# Patient Record
Sex: MALE | Race: OTHER | NOT HISPANIC OR LATINO | URBAN - METROPOLITAN AREA
[De-identification: names, ages, dates, MRNs, and addresses within clinical notes are randomized per-mention and may not be internally consistent; named-entity substitution may affect disease eponyms.]

---

## 2022-05-23 ENCOUNTER — EMERGENCY (EMERGENCY)
Facility: HOSPITAL | Age: 52
LOS: 1 days | Discharge: HOME | End: 2022-05-23
Attending: EMERGENCY MEDICINE
Payer: SELF-PAY

## 2022-05-23 VITALS
HEIGHT: 63.78 IN | RESPIRATION RATE: 20 BRPM | SYSTOLIC BLOOD PRESSURE: 130 MMHG | HEART RATE: 66 BPM | OXYGEN SATURATION: 99 % | TEMPERATURE: 98 F | WEIGHT: 160.06 LBS | DIASTOLIC BLOOD PRESSURE: 80 MMHG

## 2022-05-23 PROCEDURE — 70450 CT HEAD/BRAIN W/O DYE: CPT | Mod: 26,MA

## 2022-05-23 PROCEDURE — 72074 X-RAY EXAM THORAC SPINE4/>VW: CPT

## 2022-05-23 PROCEDURE — 72110 X-RAY EXAM L-2 SPINE 4/>VWS: CPT

## 2022-05-23 PROCEDURE — 72040 X-RAY EXAM NECK SPINE 2-3 VW: CPT

## 2022-05-23 PROCEDURE — 72110 X-RAY EXAM L-2 SPINE 4/>VWS: CPT | Mod: 26

## 2022-05-23 PROCEDURE — 72074 X-RAY EXAM THORAC SPINE4/>VW: CPT | Mod: 26

## 2022-05-23 PROCEDURE — 72040 X-RAY EXAM NECK SPINE 2-3 VW: CPT | Mod: 26

## 2022-05-23 PROCEDURE — 99284 EMERGENCY DEPT VISIT MOD MDM: CPT

## 2022-05-23 PROCEDURE — 96372 THER/PROPH/DIAG INJ SC/IM: CPT

## 2022-05-23 PROCEDURE — 99284 EMERGENCY DEPT VISIT MOD MDM: CPT | Mod: 25

## 2022-05-23 PROCEDURE — 70450 CT HEAD/BRAIN W/O DYE: CPT | Mod: MA

## 2022-05-23 RX ORDER — KETOROLAC TROMETHAMINE 30 MG/ML
30 SYRINGE (ML) INJECTION ONCE
Refills: 0 | Status: DISCONTINUED | OUTPATIENT
Start: 2022-05-23 | End: 2022-05-23

## 2022-05-23 RX ORDER — METHOCARBAMOL 500 MG/1
750 TABLET, FILM COATED ORAL ONCE
Refills: 0 | Status: COMPLETED | OUTPATIENT
Start: 2022-05-23 | End: 2022-05-23

## 2022-05-23 RX ADMIN — Medication 30 MILLIGRAM(S): at 13:03

## 2022-05-23 RX ADMIN — METHOCARBAMOL 750 MILLIGRAM(S): 500 TABLET, FILM COATED ORAL at 13:04

## 2022-05-23 NOTE — ED STATDOCS - OBJECTIVE STATEMENT
50 y/o male with no PMHx presents to ED c/o back pain/injury. Patient reports 3 days ago while at work, he was pulling some construction equipment and tripped. States he hit his head on the floor and twisted his ankle, endorses he lost consciousness for 2 minutes. Currently endorsing back pain, neck pain. Patient took Motrin and Tylenol with mild relief.      : Rosalina

## 2022-05-23 NOTE — ED STATDOCS - MUSCULOSKELETAL, MLM
swelling at the base of the left 5th metatarsal, neuro intact, full ROM at the ankle; (+) spinal tenderness, no step offs or deformities

## 2022-05-23 NOTE — ED STATDOCS - ATTENDING CONTRIBUTION TO CARE
I, Yesica Cintron, performed the initial face to face bedside interview with this patient regarding history of present illness, review of symptoms and relevant past medical, social and family history.  I completed an independent physical examination.  I was the initial provider who evaluated this patient. I have signed out the follow up of any pending tests (i.e. labs, radiological studies) to the resident.  I have communicated the patient’s plan of care and disposition with the resident.  The history, relevant review of systems, past medical and surgical history, medical decision making, and physical examination was documented by the scribe in my presence and I attest to the accuracy of the documentation.

## 2022-05-23 NOTE — ED STATDOCS - CLINICAL SUMMARY MEDICAL DECISION MAKING FREE TEXT BOX
Patient with multiple msk complaints after a fall at work 3 days go. Will check CT head, spine XR, left foot XR, treat pain, and re-assess. Patient with multiple msk complaints after a fall at work 3 days go. Will check CT head, spine XR, left foot XR, treat pain, and re-assess.    Pain improved. Will dc to home with instructions for conservative mgmt. Return precautions and discharge instructions given

## 2022-05-23 NOTE — ED STATDOCS - NSFOLLOWUPINSTRUCTIONS_ED_ALL_ED_FT
[place] Ibuprofen 600mg 2-3 veces al jerrell por 2-3 summers      Dolor musculoesquelético    Musculoskeletal Pain      "Dolor musculoesquelético" hace referencia a los tashi y las molestias en los huesos, las articulaciones, los músculos y los tejidos que los rodean. Sindhu dolor puede ocurrir en cualquier parte del cuerpo. Puede durar un breve período (catia) o prolongarse mucho tiempo (crónico).    Es posible que se realicen un examen físico, análisis de laboratorio y estudios de diagnóstico por imágenes para encontrar la causa del dolor musculoesquelético.      Siga estas instrucciones en sheets casa:    Estilo de toñito   •Trate de controlar o reducir los niveles de estrés. El estrés aumenta la tensión muscular y puede empeorar el dolor musculoesquelético. Es importante reconocer cuando está ansioso o estresado y aprender distintas formas de controlar sindhu estado. [place] puede incluir:  •Yoga o meditación.      •Terapia cognitiva o conductual.      •Acupuntura o terapia de masajes.        •Podrá seguir con todas las actividades, a menos que estas le generen más dolor. Cuando el dolor disminuya, retome las actividades habituales de a poco. Aumente gradualmente la intensidad y la duración de las actividades o del ejercicio que realice.        Control del dolor, la rigidez y la hinchazón                   •El tratamiento puede incluir medicamentos para el dolor y la inflamación que se marimar por boca o que se aplican sobre la piel. Use los medicamentos de venta peña y los recetados solamente vanessa se lo haya indicado el médico.      •Si el dolor es intenso, el reposo en cama puede ser beneficioso. Acuéstese o siéntese en cualquier posición que sea cómoda, dacia salga de la cama y camine al menos cada dos horas.    •Si se lo indican, aplique calor en la jayleen afectada con la frecuencia que le haya indicado el médico. Use la clarke de calor que el médico le recomiende, vanessa anriudh compresa de calor húmedo o anirudh almohadilla térmica.  •Coloque anirudh toalla entre la piel y la clarke de calor.      •Aplique calor jack 20 a 30 minutos.      •Retire la clarke de calor si la piel se pone de color bruner brillante. [place] es especialmente importante si no puede sentir dolor, calor o frío. Puede correr un riesgo mayor de sufrir quemaduras.      •Si se lo indican, aplique hielo sobre la jayleen dolorida. Para hacer esto:  •Ponga el hielo en anirudh bolsa plástica.      •Coloque anirudh toalla entre la piel y la bolsa.      •Aplique el hielo jack 20 minutos, 2 o 3 veces por día.      •Retire el hielo si la piel se pone de color bruner brillante. [place] es muy importante. Si no puede sentir dolor, calor o frío, tiene un mayor riesgo de que se dañe la jayleen.        Indicaciones generales     •El médico puede recomendarle que consulte a un fisioterapeuta. Esta persona puede ayudarlo a elaborar un programa de ejercicios seguro.      •Si se lo indica el médico, sneha ejercicios de fisioterapia para mejorar el movimiento y la fuerza de la jayleen afectada.      •Cumpla con todas las visitas de seguimiento. [place] es importante. [place] incluye las visitas al fisioterapeuta.        Comuníquese con un médico si:    •El dolor empeora.      •Los medicamentos no alivian el dolor.      •No puede usar la parte del cuerpo que le duele, vanessa un brazo, anirudh pierna o el debi.      •Tiene dificultad para dormir.      •Tiene dificultad para realizar las actividades cotidianas.        Solicite ayuda de inmediato si:    •Tiene anirudh nueva lesión o el dolor empeora o es diferente.      •Tiene adormecimiento u hormigueo en la jayleen dolorida.        Resumen    •"Dolor musculoesquelético" hace referencia a los tashi y las molestias en los huesos, las articulaciones, los músculos y los tejidos que los rodean.      •Sindhu dolor puede ocurrir en cualquier parte del cuerpo.      •El médico puede recomendarle que consulte a un fisioterapeuta. Esta persona puede ayudarlo a elaborar un programa de ejercicios seguro. Sneha ejercicios vanessa se lo haya indicado el fisioterapeuta.      •Disminuya los niveles de estrés. El estrés puede empeorar el dolor musculoesquelético. Entre los métodos para disminuir el estrés se pueden mencionar la meditación, el yoga, la terapia cognitiva o conductual, la acupuntura y la terapia de masajes.

## 2022-05-23 NOTE — ED STATDOCS - PATIENT PORTAL LINK FT
You can access the FollowMyHealth Patient Portal offered by [facility] by registering at the following website: [email or web address]. By joining [facility]’s FollowMyHealth portal, you will also be able to view your health information using other applications (apps) compatible with our system.
